# Patient Record
Sex: MALE | Race: AMERICAN INDIAN OR ALASKA NATIVE | NOT HISPANIC OR LATINO | Employment: FULL TIME | URBAN - METROPOLITAN AREA
[De-identification: names, ages, dates, MRNs, and addresses within clinical notes are randomized per-mention and may not be internally consistent; named-entity substitution may affect disease eponyms.]

---

## 2018-02-21 ENCOUNTER — OFFICE VISIT (OUTPATIENT)
Dept: DERMATOLOGY | Facility: CLINIC | Age: 43
End: 2018-02-21
Payer: COMMERCIAL

## 2018-02-21 DIAGNOSIS — L73.1 PSEUDOFOLLICULITIS BARBAE: ICD-10-CM

## 2018-02-21 DIAGNOSIS — L81.9 DYSCHROMIA: ICD-10-CM

## 2018-02-21 DIAGNOSIS — L70.0 ACNE VULGARIS: Primary | ICD-10-CM

## 2018-02-21 PROCEDURE — 99202 OFFICE O/P NEW SF 15 MIN: CPT | Mod: S$GLB,,, | Performed by: DERMATOLOGY

## 2018-02-21 PROCEDURE — 99999 PR PBB SHADOW E&M-NEW PATIENT-LVL II: CPT | Mod: PBBFAC,,, | Performed by: DERMATOLOGY

## 2018-02-21 RX ORDER — BENZOYL PEROXIDE 100 MG/ML
LIQUID TOPICAL DAILY
Qty: 227 G | Refills: 12 | Status: SHIPPED | OUTPATIENT
Start: 2018-02-21 | End: 2019-02-21

## 2018-02-21 NOTE — PROGRESS NOTES
Subjective:       Patient ID:  Epi Lopez is a 42 y.o. male who presents for   Chief Complaint   Patient presents with    Acne     c/o hyperpigmentation, black heads, acne, ingrown facial hairs x 6 years     History of Present Illness: The patient presents with chief complaint of acne, hyperpigmentation, ingrown hairs.  Location: face  Duration: 6 years  Signs/Symptoms: none    Prior treatments: none          Review of Systems   Constitutional: Negative for fever and chills.   Gastrointestinal: Negative for nausea and vomiting.   Skin: Negative for daily sunscreen use, activity-related sunscreen use and recent sunburn.   Hematologic/Lymphatic: Does not bruise/bleed easily.        Objective:    Physical Exam   Constitutional: He appears well-developed and well-nourished. No distress.   Neurological: He is alert and oriented to person, place, and time. He is not disoriented.   Psychiatric: He has a normal mood and affect.   Skin:   Areas Examined (abnormalities noted in diagram):   Head / Face Inspection Performed  Neck Inspection Performed  Chest / Axilla Inspection Performed  Abdomen Inspection Performed  Back Inspection Performed  RUE Inspected  LUE Inspection Performed  Nails and Digits Inspection Performed              Diagram Legend       Surgical scar with no sign of skin cancer recurrence      Open and closed comedones      Inflammatory papules and pustules       Assessment / Plan:        Acne vulgaris  Dyschromia  -     tazarotene (TAZORAC) 0.05 % Crea cream; Apply pea-sized amount to entire face at bedtime.  Use twice weekly and increase as tolerated to nightly.  Dispense: 30 g; Refill: 3  -     Will start above med with cetaphil oil control cleanser and lotion.     Pseudofolliculitis barbae  -     benzoyl peroxide (BP WASH) 10 % external wash; Apply topically once daily. Use as needed after shaving to prevent in-grown hairs.  Dispense: 227 g; Refill: 12  -     Discussed dx, AVS given.  Recommend pt  avoids close shaving/cutting of beard area to prevent ingrown hairs. The patient acknowledged understanding.                Follow-up in about 3 months (around 5/21/2018).

## 2020-05-03 ENCOUNTER — HOSPITAL ENCOUNTER (EMERGENCY)
Facility: HOSPITAL | Age: 45
Discharge: HOME | End: 2020-05-03
Attending: EMERGENCY MEDICINE
Payer: COMMERCIAL

## 2020-05-03 VITALS
DIASTOLIC BLOOD PRESSURE: 78 MMHG | SYSTOLIC BLOOD PRESSURE: 118 MMHG | RESPIRATION RATE: 18 BRPM | HEART RATE: 69 BPM | OXYGEN SATURATION: 99 % | WEIGHT: 140 LBS | TEMPERATURE: 98 F | HEIGHT: 71 IN | BODY MASS INDEX: 19.6 KG/M2

## 2020-05-03 DIAGNOSIS — M54.2 NECK PAIN: ICD-10-CM

## 2020-05-03 DIAGNOSIS — R11.0 NAUSEA: ICD-10-CM

## 2020-05-03 DIAGNOSIS — R51.9 NONINTRACTABLE HEADACHE, UNSPECIFIED CHRONICITY PATTERN, UNSPECIFIED HEADACHE TYPE: ICD-10-CM
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
## 2020-05-03 DIAGNOSIS — M79.641 RIGHT HAND PAIN: ICD-10-CM

## 2020-05-03 PROCEDURE — 99284 EMERGENCY DEPT VISIT MOD MDM: CPT | Mod: 25

## 2020-05-03 PROCEDURE — 25000003 PHARM REV CODE 250: Performed by: NURSE PRACTITIONER

## 2020-05-03 RX ORDER — ONDANSETRON 4 MG/1
4 TABLET, ORALLY DISINTEGRATING ORAL
Status: COMPLETED | OUTPATIENT
Start: 2020-05-03 | End: 2020-05-03

## 2020-05-03 RX ORDER — DICLOFENAC SODIUM 50 MG/1
50 TABLET, DELAYED RELEASE ORAL 2 TIMES DAILY
Qty: 10 TABLET | Refills: 0 | Status: SHIPPED | OUTPATIENT
Start: 2020-05-03 | End: 2020-05-04

## 2020-05-03 RX ORDER — METOCLOPRAMIDE 10 MG/1
10 TABLET ORAL EVERY 6 HOURS PRN
Qty: 30 TABLET | Refills: 0 | Status: SHIPPED | OUTPATIENT
Start: 2020-05-03 | End: 2020-05-04

## 2020-05-03 RX ORDER — METOCLOPRAMIDE 5 MG/1
10 TABLET ORAL
Status: COMPLETED | OUTPATIENT
Start: 2020-05-03 | End: 2020-05-03

## 2020-05-03 RX ORDER — ONDANSETRON 4 MG/1
4 TABLET, FILM COATED ORAL EVERY 6 HOURS PRN
Qty: 12 TABLET | Refills: 0 | Status: SHIPPED | OUTPATIENT
Start: 2020-05-03 | End: 2020-05-04

## 2020-05-03 RX ORDER — KETOROLAC TROMETHAMINE 10 MG/1
10 TABLET, FILM COATED ORAL
Status: COMPLETED | OUTPATIENT
Start: 2020-05-03 | End: 2020-05-03

## 2020-05-03 RX ADMIN — ONDANSETRON 4 MG: 4 TABLET, ORALLY DISINTEGRATING ORAL at 08:05

## 2020-05-03 RX ADMIN — METOCLOPRAMIDE HYDROCHLORIDE 10 MG: 5 TABLET ORAL at 08:05

## 2020-05-03 RX ADMIN — KETOROLAC TROMETHAMINE 10 MG: 10 TABLET, FILM COATED ORAL at 08:05

## 2020-05-04 ENCOUNTER — TELEPHONE (OUTPATIENT)
Dept: INTERNAL MEDICINE | Facility: CLINIC | Age: 45
End: 2020-05-04

## 2020-05-04 ENCOUNTER — OFFICE VISIT (OUTPATIENT)
Dept: INTERNAL MEDICINE | Facility: CLINIC | Age: 45
End: 2020-05-04
Payer: COMMERCIAL

## 2020-05-04 VITALS
HEART RATE: 61 BPM | HEIGHT: 70 IN | BODY MASS INDEX: 20.04 KG/M2 | WEIGHT: 140 LBS | RESPIRATION RATE: 16 BRPM | DIASTOLIC BLOOD PRESSURE: 86 MMHG | SYSTOLIC BLOOD PRESSURE: 122 MMHG | TEMPERATURE: 98 F | OXYGEN SATURATION: 98 %
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
## 2020-05-04 DIAGNOSIS — G44.209 ACUTE NON INTRACTABLE TENSION-TYPE HEADACHE: ICD-10-CM

## 2020-05-04 DIAGNOSIS — M54.2 POSTERIOR NECK PAIN: Primary | ICD-10-CM

## 2020-05-04 DIAGNOSIS — M62.838 MUSCLE SPASM: ICD-10-CM

## 2020-05-04 PROCEDURE — 99999 PR PBB SHADOW E&M-EST. PATIENT-LVL III: CPT | Mod: PBBFAC,,, | Performed by: FAMILY MEDICINE

## 2020-05-04 PROCEDURE — 99999 PR PBB SHADOW E&M-EST. PATIENT-LVL III: ICD-10-PCS | Mod: PBBFAC,,, | Performed by: FAMILY MEDICINE

## 2020-05-04 PROCEDURE — 99204 PR OFFICE/OUTPT VISIT, NEW, LEVL IV, 45-59 MIN: ICD-10-PCS | Mod: S$GLB,,, | Performed by: FAMILY MEDICINE

## 2020-05-04 PROCEDURE — 3008F BODY MASS INDEX DOCD: CPT | Mod: CPTII,S$GLB,, | Performed by: FAMILY MEDICINE

## 2020-05-04 PROCEDURE — 3008F PR BODY MASS INDEX (BMI) DOCUMENTED: ICD-10-PCS | Mod: CPTII,S$GLB,, | Performed by: FAMILY MEDICINE

## 2020-05-04 PROCEDURE — 99204 OFFICE O/P NEW MOD 45 MIN: CPT | Mod: S$GLB,,, | Performed by: FAMILY MEDICINE

## 2020-05-04 RX ORDER — TIZANIDINE 4 MG/1
4 TABLET ORAL EVERY 8 HOURS
Qty: 30 TABLET | Refills: 0 | Status: SHIPPED | OUTPATIENT
Start: 2020-05-04 | End: 2020-05-14

## 2020-05-04 RX ORDER — METHYLPREDNISOLONE 4 MG/1
TABLET ORAL
Qty: 1 PACKAGE | Refills: 0 | Status: SHIPPED | OUTPATIENT
Start: 2020-05-04 | End: 2020-07-22

## 2020-05-04 NOTE — TELEPHONE ENCOUNTER
----- Message from Yahaira Smith sent at 5/4/2020 11:47 AM CDT -----  Contact: [phone]  11:46 patient will be 15 min's late, call but got no answer, please call  Back at  [phone], thanks sj

## 2020-05-04 NOTE — TELEPHONE ENCOUNTER
Left vm for pt to return call to clinic in regards to appointment scheduled for today at 11:40. //BJ
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
## 2020-05-04 NOTE — TELEPHONE ENCOUNTER
----- Message from Bisi Michael sent at 5/4/2020 12:50 PM CDT -----  Contact: patient  Patient needs to  a work excuse for his visit from today, please call him back at [phone]. Thank you
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
## 2020-05-04 NOTE — LETTER
May 4, 2020      [facility]  [address]  [place] [address]  Phone: [phone]  Fax: [phone]       Patient: Epi Lopez   YOB: 1975  Date of Visit: 05/04/2020    To Whom It May Concern:    Hailey Lopez  was at [facility] on 05/04/2020. He may return to work/school on 05/07/2020 with no restrictions. If you have any questions or concerns, or if I can be of further assistance, please do not hesitate to contact me.    Sincerely,    Delfino Tyler MD, FAAFP

## 2020-05-06 ENCOUNTER — TELEPHONE (OUTPATIENT)
Dept: INTERNAL MEDICINE | Facility: CLINIC | Age: 45
End: 2020-05-06

## 2020-05-06 NOTE — TELEPHONE ENCOUNTER
----- Message from Kelley Parker sent at 5/6/2020  3:35 PM CDT -----  Contact: Magda Webber requesting a call back regarding pt and headaches. She states that they were advised to call back in today if pt was still having problems. Please call Lawanda at [phone]

## 2020-05-07 ENCOUNTER — TELEPHONE (OUTPATIENT)
Dept: INTERNAL MEDICINE | Facility: CLINIC | Age: 45
End: 2020-05-07

## 2020-05-07 NOTE — TELEPHONE ENCOUNTER
----- Message from Grace Yahir sent at 5/7/2020  1:04 PM CDT -----  Contact: pt  Type:  Patient Returning Call    Who Called:pt  Who Left Message for Patient:nurse  Does the patient know what this is regarding?:Statests he is seeing a chirporactor Dr Mckeon. His work excuse.  Would the patient rather a call back or a response via [facility]? Call back  Best Call Back Number:[phone]  Additional Information: .    Thank you

## 2020-05-07 NOTE — TELEPHONE ENCOUNTER
Letter extending work excuse printed out for signature.    Have him follow-up on Monday as planned.    Will defer any physical therapy till then.

## 2020-05-07 NOTE — TELEPHONE ENCOUNTER
Pt states he spoke with  and the doctor stated to let him know if the medication wasn't working.Pt called and stated that the medication is not working for him .Pt stated that he is  still light headed and nausea. Pt would like to know if he can get a excuse for work with   Monday 11, 2020 being the return date due to him having a appointment schedule//LB

## 2020-05-08 ENCOUNTER — TELEPHONE (OUTPATIENT)
Dept: INTERNAL MEDICINE | Facility: CLINIC | Age: 45
End: 2020-05-08

## 2020-05-08 NOTE — TELEPHONE ENCOUNTER
----- Message from Vero Lily sent at 5/8/2020 12:06 PM CDT -----  Contact: pt  Type: Needs Medical Advice    Who Called:      Best Call Back Number:     Additional Information: Requesting a call back from Nurse, regarding pt needs to add his spouse on his account so nurse can talk to her but has questions before doing so please call today per pt request

## 2020-05-08 NOTE — TELEPHONE ENCOUNTER
Spoke to the pt about medication that he was put on Tizanidine he states that the medication is making him nauseated and was told if he was having any complication with the medication to let you know. Pt also would like a external referral to Dr Rachelle Altamirano he's a chiropractor that he found.

## 2020-05-08 NOTE — TELEPHONE ENCOUNTER
----- Message from Lindsey Villela sent at 5/8/2020  9:26 AM CDT -----  Contact: pt  Pt request call back , needing a work excuse asap and medication change ... Call back :  [phone]

## 2020-05-08 NOTE — TELEPHONE ENCOUNTER
Spoke to pt about his appoint on Monday he stated that he is already seeing Dr Bush and he will cancel his appointment he has on Monday with Dr Tyler. Explain to him the excuse was sent to the  for  verbalized understanding
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
## 2020-07-21 ENCOUNTER — TELEPHONE (OUTPATIENT)
Dept: INTERNAL MEDICINE | Facility: CLINIC | Age: 45
End: 2020-07-21

## 2020-07-21 NOTE — TELEPHONE ENCOUNTER
----- Message from Aliza Chapin MA sent at 7/21/2020 10:49 AM CDT -----  Contact: Lawanda Tita  Patient requesting appointment with Dr. Tyler.  Please advise.  jung  ----- Message -----  From: Teena Santana  Sent: 7/21/2020   9:33 AM CDT  To: Elena GIFFORD Staff (Int Med)    Type:  Sooner Apoointment Request    Caller is requesting a sooner appointment.  Caller declined first available appointment listed below.  Caller will not accept being placed on the waitlist and is requesting a message be sent to doctor.  Name of Caller: Epi Lopez's significant other / wife (Lawanda Rivers)  When is the first available appointment? Today 7/21/20  Symptoms: back pain  Would the patient rather a call back or a response via [facility]? Call back  Best Call Back Number: [phone] (Lawanda's cell phone)   Additional Information: Patient's wife would like to reschedule patient's appointment today with Dr. Parker and to reschedule with PCP Delfino Talbert Only Tomorrow at 11 am

## 2020-07-21 NOTE — TELEPHONE ENCOUNTER
----- Message from Teena Santana sent at 7/21/2020  9:33 AM CDT -----  Contact: Lawanda Valporter  Type:  Sooner Apoointment Request    Caller is requesting a sooner appointment.  Caller declined first available appointment listed below.  Caller will not accept being placed on the waitlist and is requesting a message be sent to doctor.  Name of Caller: Epi Lopez's significant other / wife (Lawanda Rivers)  When is the first available appointment? Today 7/21/20  Symptoms: back pain  Would the patient rather a call back or a response via [facility]? Call back  Best Call Back Number: [phone] (Lawanda's cell phone)   Additional Information: Patient's wife would like to reschedule patient's appointment today with Dr. Parker and to reschedule with PCP Delfino Talbert Only Tomorrow at 11 am

## 2020-07-22 ENCOUNTER — TELEPHONE (OUTPATIENT)
Dept: INTERNAL MEDICINE | Facility: CLINIC | Age: 45
End: 2020-07-22

## 2020-07-22 ENCOUNTER — OFFICE VISIT (OUTPATIENT)
Dept: INTERNAL MEDICINE | Facility: CLINIC | Age: 45
End: 2020-07-22
Payer: COMMERCIAL

## 2020-07-22 VITALS
HEIGHT: 70 IN | TEMPERATURE: 98 F | BODY MASS INDEX: 19.86 KG/M2 | WEIGHT: 138.69 LBS | OXYGEN SATURATION: 98 % | SYSTOLIC BLOOD PRESSURE: 120 MMHG | HEART RATE: 60 BPM | DIASTOLIC BLOOD PRESSURE: 92 MMHG

## 2020-07-22 DIAGNOSIS — M54.50 ACUTE BILATERAL LOW BACK PAIN WITHOUT SCIATICA: Primary | ICD-10-CM

## 2020-07-22 PROCEDURE — 99214 PR OFFICE/OUTPT VISIT, EST, LEVL IV, 30-39 MIN: ICD-10-PCS | Mod: S$GLB,,, | Performed by: NURSE PRACTITIONER

## 2020-07-22 PROCEDURE — 3008F BODY MASS INDEX DOCD: CPT | Mod: CPTII,S$GLB,, | Performed by: NURSE PRACTITIONER

## 2020-07-22 PROCEDURE — 99999 PR PBB SHADOW E&M-EST. PATIENT-LVL III: CPT | Mod: PBBFAC,,, | Performed by: NURSE PRACTITIONER

## 2020-07-22 PROCEDURE — 3008F PR BODY MASS INDEX (BMI) DOCUMENTED: ICD-10-PCS | Mod: CPTII,S$GLB,, | Performed by: NURSE PRACTITIONER

## 2020-07-22 PROCEDURE — 99214 OFFICE O/P EST MOD 30 MIN: CPT | Mod: S$GLB,,, | Performed by: NURSE PRACTITIONER

## 2020-07-22 PROCEDURE — 99999 PR PBB SHADOW E&M-EST. PATIENT-LVL III: ICD-10-PCS | Mod: PBBFAC,,, | Performed by: NURSE PRACTITIONER

## 2020-07-22 RX ORDER — MELOXICAM 7.5 MG/1
TABLET ORAL
Qty: 60 TABLET | Refills: 1 | Status: SHIPPED | OUTPATIENT
Start: 2020-07-22

## 2020-07-22 RX ORDER — CYCLOBENZAPRINE HCL 10 MG
10 TABLET ORAL NIGHTLY PRN
Qty: 30 TABLET | Refills: 1 | Status: SHIPPED | OUTPATIENT
Start: 2020-07-22

## 2020-07-22 NOTE — LETTER
July 22, 2020    Epi Lopez  [address]  [facility] [address]             [facility]  Internal Medicine  [address] [facility] [address]  Phone: [phone]  Fax: [phone]   July 22, 2020     Patient: Epi Lopez   YOB: 1975   Date of Visit: 7/22/2020       To Whom it May Concern:    Epi Lopez was seen in my clinic on 7/22/2020. He may return on 07/23/2020.    Please excuse him from any work missed.    If you have any questions or concerns, please don't hesitate to call.    Sincerely,         Beth Cohn NP
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
